# Patient Record
Sex: FEMALE | Race: WHITE | Employment: UNEMPLOYED | URBAN - METROPOLITAN AREA
[De-identification: names, ages, dates, MRNs, and addresses within clinical notes are randomized per-mention and may not be internally consistent; named-entity substitution may affect disease eponyms.]

---

## 2017-10-01 PROBLEM — F14.10 COCAINE ABUSE (HCC): Status: ACTIVE | Noted: 2017-10-01

## 2018-09-29 ENCOUNTER — HOSPITAL ENCOUNTER (EMERGENCY)
Age: 43
Discharge: HOME | End: 2018-09-30
Attending: EMERGENCY MEDICINE
Payer: MEDICARE

## 2018-09-29 VITALS
WEIGHT: 105 LBS | HEART RATE: 94 BPM | RESPIRATION RATE: 16 BRPM | TEMPERATURE: 97.9 F | DIASTOLIC BLOOD PRESSURE: 65 MMHG | HEIGHT: 67 IN | BODY MASS INDEX: 16.48 KG/M2 | OXYGEN SATURATION: 97 % | SYSTOLIC BLOOD PRESSURE: 116 MMHG

## 2018-09-29 DIAGNOSIS — L08.9 SKIN PUSTULE: Primary | ICD-10-CM

## 2018-09-29 PROCEDURE — 99282 EMERGENCY DEPT VISIT SF MDM: CPT

## 2018-09-29 ASSESSMENT — PAIN DESCRIPTION - DESCRIPTORS: DESCRIPTORS: THROBBING

## 2018-09-29 ASSESSMENT — PAIN DESCRIPTION - ORIENTATION: ORIENTATION: POSTERIOR

## 2018-09-29 ASSESSMENT — PAIN SCALES - GENERAL: PAINLEVEL_OUTOF10: 6

## 2018-09-29 ASSESSMENT — PAIN DESCRIPTION - PAIN TYPE: TYPE: ACUTE PAIN

## 2018-09-29 ASSESSMENT — PAIN DESCRIPTION - LOCATION: LOCATION: HEAD

## 2018-09-30 PROCEDURE — 6370000000 HC RX 637 (ALT 250 FOR IP): Performed by: EMERGENCY MEDICINE

## 2018-09-30 RX ORDER — DOXYCYCLINE HYCLATE 100 MG
100 TABLET ORAL 2 TIMES DAILY
Qty: 14 TABLET | Refills: 0 | Status: SHIPPED | OUTPATIENT
Start: 2018-09-30 | End: 2018-10-07

## 2018-09-30 RX ORDER — DIAPER,BRIEF,INFANT-TODD,DISP
EACH MISCELLANEOUS
Qty: 30 G | Refills: 0 | Status: SHIPPED | OUTPATIENT
Start: 2018-09-30

## 2018-09-30 RX ORDER — DOXYCYCLINE 100 MG/1
100 CAPSULE ORAL ONCE
Status: COMPLETED | OUTPATIENT
Start: 2018-09-30 | End: 2018-09-30

## 2018-09-30 RX ADMIN — DOXYCYCLINE 100 MG: 100 CAPSULE ORAL at 00:11

## 2018-09-30 NOTE — ED PROVIDER NOTES
[facility] EMERGENCY DEPT VISIT      Patient Identification  Sandrita Barrientos is a 37 y.o. female. Chief Complaint   Mass (Pt came in by squad for \"bumps to the back of head\". Pt concerned with bug bites to posterior head. Has been trying to drain the middle lump +Pus; Scab to one of the areas. Also recently started Suboxone. )      History of Present Illness: This is a  37 y.o. female who presents ambulatory  to the ED with complaints of 2 day h/o painful knots to the back of her scalp. No fever. Not pruritic. She found a dead spider in her bed today. No other bites or lesions. Past Medical History:   Diagnosis Date    Drug abuse     Fibromyalgia     Migraine        Past Surgical History:   Procedure Laterality Date     SECTION       SECTION, CLASSIC         No current facility-administered medications for this encounter. Current Outpatient Prescriptions:     doxycycline hyclate (VIBRA-TABS) 100 MG tablet, Take 1 tablet by mouth 2 times daily for 7 days, Disp: 14 tablet, Rfl: 0    hydrocortisone 1 % ointment, Apply topically 2 times daily. , Disp: 30 g, Rfl: 0    Buprenorphine HCl-Naloxone HCl (SUBOXONE SL), Place 8 mg under the tongue, Disp: , Rfl:     gabapentin (NEURONTIN) 800 MG tablet, Take 800 mg by mouth 3 times daily, Disp: , Rfl:     ondansetron (ZOFRAN ODT) 4 MG disintegrating tablet, Take 1 tablet by mouth every 8 hours as needed for Nausea or Vomiting, Disp: 10 tablet, Rfl: 0    QUEtiapine (SEROQUEL XR) 50 MG extended release tablet, Take 50 mg by mouth nightly, Disp: , Rfl:     pregabalin (LYRICA) 50 MG capsule, Take 50 mg by mouth 3 times daily, Disp: , Rfl:     Allergies   Allergen Reactions    Pcn [Penicillins]        Social History     Social History    Marital status: Unknown     Spouse name: N/A    Number of children: N/A    Years of education: N/A     Occupational History    Not on file.      Social History Main Topics    Smoking status: Current Every Day Smoker     Packs/day: 1.50     Types: Cigarettes    Smokeless tobacco: Never Used    Alcohol use Yes      Comment: occ    Drug use: Yes     Types: Cocaine      Comment: Smokes cocaine.  Sexual activity: Not Currently     Other Topics Concern    Not on file     Social History Narrative    ** Merged History Encounter **            Nursing Notes Reviewed      ROS:  General: no fever  ENT: no sinus congestion, no sore throat  RESP: no cough, no shortness of breath  GI: no abdominal pain, no vomiting, no diarrhea  Musculoskeletal: no arthralgia, no myalgia, no back pain, no neck pain  NEURO: + headache, no numbness, no weakness  DERM: + rash, no erythema, no ecchymosis, no wounds      PHYSICAL EXAM:  GENERAL APPEARANCE: Nicole Pagan is in no acute respiratory distress. Awake and alert. VITAL SIGNS:   ED Triage Vitals [09/29/18 2323]   Enc Vitals Group      /65      Pulse 94      Resp 16      Temp 97.9 °F (36.6 °C)      Temp Source Oral      SpO2 97 %      Weight 105 lb (47.6 kg)      Height 5' 7\" (1.702 m)      Head Circumference       Peak Flow       Pain Score       Pain Loc       Pain Edu? Excl. in [address]? HEAD: Normocephalic, atraumatic. Several erythematous papules on posterior scalp that look like bites. No fluctuance. All 1cm or less in size. Tender occipital adenopathy present  EYES:  Extraocular muscles are intact. Conjunctivas are pink. Negative scleral icterus. ENT:  Mucous membranes are moist.  Pharynx without erythema or exudates. NECK: Nontender and supple. CHEST: Clear to auscultation bilaterally. No rales, rhonchi, or wheezing. HEART:  Regular rate and rhythm. No murmurs. Strong and equal pulses in the upper and lower extremities. ABDOMEN: Soft,  nondistended, positive bowel sounds. abdomen is nontender. MUSCULOSKELETAL:  Active range of motion of the upper and lower extremities. No edema. NEUROLOGICAL: Awake, alert and oriented x 3.  Power intact in the upper and lower extremities. DERMATOLOGIC: No petechiae, rashes, or ecchymoses. ED COURSE AND MEDICAL DECISION MAKING:    Treatment in the department:  Patient received   Medications   doxycycline monohydrate (MONODOX) capsule 100 mg (100 mg Oral Given 9/30/18 0011)      while in the ED. Medical decision making:  Patient presents emergency Department with several skin lesions over her scalp. These do look like bites. I'm not appreciating any signs of lice. There is no fluctuance. This could be bites or possibly folliculitis. We'll start her on oral antibiotics as well as hydrocortisone ointment. I estimate there is LOW risk for CELLULITIS, MENINGITIS, NECROTIZING FASCIITIS, MENINGOCOCCEMIA, DEEP SPACE INFECTION, thus I consider the discharge disposition reasonable. Also, there is no evidence or peritonitis, sepsis, or toxicity. Mickey Rossi and I have discussed the diagnosis and risks, and we agree with discharging home to follow-up with their primary doctor. We also discussed returning to the Emergency Department immediately if new or worsening symptoms occur. We have discussed the symptoms which are most concerning (e.g., changing or worsening pain, fever, numbness, weakness, cool or painful digits) that necessitate immediate return      Clinical Impression:  1. Skin pustule        Dispo:  Patient will be discharged  at this time. Patient was informed of this decision and agrees with plan. I have discussed lab and xray findings with patient and they understand. Questions were answered to the best of my ability. Discharge vitals:  Blood pressure 116/65, pulse 94, temperature 97.9 °F (36.6 °C), temperature source Oral, resp. rate 16, height 5' 7\" (1.702 m), weight 47.6 kg (105 lb), last menstrual period 09/23/2018, SpO2 97 %.     Prescriptions given:   Discharge Medication List as of 9/30/2018 12:07 AM      START taking these medications    Details   doxycycline hyclate (VIBRA-TABS) 100 MG tablet Take 1 tablet by mouth 2 times daily for 7 days, Disp-14 tablet, R-0Print      hydrocortisone 1 % ointment Apply topically 2 times daily. , Disp-30 g, R-0, Print               This chart was created using dragon voice recognition software.        Ghanshyam Uribe MD  09/30/18 0157

## 2019-01-03 ENCOUNTER — HOSPITAL ENCOUNTER (EMERGENCY)
Age: 44
Discharge: HOME | End: 2019-01-03
Attending: EMERGENCY MEDICINE
Payer: MEDICARE

## 2019-01-03 VITALS
DIASTOLIC BLOOD PRESSURE: 86 MMHG | TEMPERATURE: 98.7 F | WEIGHT: 92.7 LBS | HEART RATE: 97 BPM | OXYGEN SATURATION: 99 % | HEIGHT: 67 IN | RESPIRATION RATE: 20 BRPM | SYSTOLIC BLOOD PRESSURE: 133 MMHG | BODY MASS INDEX: 14.55 KG/M2

## 2019-01-03 DIAGNOSIS — G89.29 CHRONIC BILATERAL LOW BACK PAIN WITHOUT SCIATICA: Primary | ICD-10-CM

## 2019-01-03 DIAGNOSIS — M54.50 CHRONIC BILATERAL LOW BACK PAIN WITHOUT SCIATICA: Primary | ICD-10-CM

## 2019-01-03 LAB
BACTERIA: ABNORMAL /HPF
BILIRUBIN URINE: NEGATIVE MG/DL
BLOOD, URINE: ABNORMAL
CLARITY: ABNORMAL
COLOR: ABNORMAL
EPITHELIAL CELLS, UA: ABNORMAL /HPF
GLUCOSE URINE: NEGATIVE MG/DL
KETONES, URINE: NEGATIVE MG/DL
LEUKOCYTE ESTERASE, URINE: ABNORMAL
MICROSCOPIC EXAMINATION: YES
NITRITE, URINE: NEGATIVE
PH UA: 6
PREGNANCY, URINE: NEGATIVE
PROTEIN UA: NEGATIVE MG/DL
RBC UA: ABNORMAL /HPF (ref 0–2)
SPECIFIC GRAVITY UA: 1.01
UROBILINOGEN, URINE: 0.2 E.U./DL
WBC UA: ABNORMAL /HPF (ref 0–5)

## 2019-01-03 PROCEDURE — 81001 URINALYSIS AUTO W/SCOPE: CPT

## 2019-01-03 PROCEDURE — 6370000000 HC RX 637 (ALT 250 FOR IP): Performed by: EMERGENCY MEDICINE

## 2019-01-03 PROCEDURE — 99283 EMERGENCY DEPT VISIT LOW MDM: CPT

## 2019-01-03 PROCEDURE — 84703 CHORIONIC GONADOTROPIN ASSAY: CPT

## 2019-01-03 RX ORDER — ACETAMINOPHEN 500 MG
1000 TABLET ORAL ONCE
Status: COMPLETED | OUTPATIENT
Start: 2019-01-03 | End: 2019-01-03

## 2019-01-03 RX ORDER — LIDOCAINE 4 G/G
1 PATCH TOPICAL ONCE
Status: DISCONTINUED | OUTPATIENT
Start: 2019-01-03 | End: 2019-01-03 | Stop reason: HOSPADM

## 2019-01-03 RX ORDER — LIDOCAINE 50 MG/G
1 PATCH TOPICAL DAILY
Qty: 30 PATCH | Refills: 0 | Status: SHIPPED | OUTPATIENT
Start: 2019-01-03

## 2019-01-03 RX ADMIN — ACETAMINOPHEN 1000 MG: 500 TABLET, FILM COATED ORAL at 05:53

## 2019-01-03 ASSESSMENT — PAIN DESCRIPTION - FREQUENCY: FREQUENCY: CONTINUOUS

## 2019-01-03 ASSESSMENT — ENCOUNTER SYMPTOMS
EYES NEGATIVE: 1
GASTROINTESTINAL NEGATIVE: 1
BACK PAIN: 1
RESPIRATORY NEGATIVE: 1

## 2019-01-03 ASSESSMENT — PAIN DESCRIPTION - DESCRIPTORS: DESCRIPTORS: SHOOTING;SHARP

## 2019-01-03 ASSESSMENT — PAIN DESCRIPTION - LOCATION: LOCATION: FLANK

## 2019-01-03 ASSESSMENT — PAIN DESCRIPTION - ORIENTATION: ORIENTATION: RIGHT

## 2019-01-03 ASSESSMENT — PAIN SCALES - GENERAL
PAINLEVEL_OUTOF10: 10
PAINLEVEL_OUTOF10: 10

## 2019-01-03 ASSESSMENT — PAIN DESCRIPTION - PAIN TYPE: TYPE: ACUTE PAIN

## 2019-03-27 ENCOUNTER — HOSPITAL ENCOUNTER (EMERGENCY)
Age: 44
Discharge: HOME | End: 2019-03-27
Attending: EMERGENCY MEDICINE
Payer: MEDICARE

## 2019-03-27 VITALS
OXYGEN SATURATION: 97 % | DIASTOLIC BLOOD PRESSURE: 76 MMHG | RESPIRATION RATE: 16 BRPM | WEIGHT: 104.6 LBS | HEIGHT: 67 IN | BODY MASS INDEX: 16.42 KG/M2 | HEART RATE: 89 BPM | TEMPERATURE: 97.8 F | SYSTOLIC BLOOD PRESSURE: 113 MMHG

## 2019-03-27 DIAGNOSIS — N76.4 ABSCESS OF LEFT GENITAL LABIA: Primary | ICD-10-CM

## 2019-03-27 PROCEDURE — 99282 EMERGENCY DEPT VISIT SF MDM: CPT

## 2019-03-27 PROCEDURE — 6370000000 HC RX 637 (ALT 250 FOR IP): Performed by: EMERGENCY MEDICINE

## 2019-03-27 RX ORDER — CEPHALEXIN 500 MG/1
1000 CAPSULE ORAL ONCE
Status: COMPLETED | OUTPATIENT
Start: 2019-03-27 | End: 2019-03-27

## 2019-03-27 RX ORDER — SULFAMETHOXAZOLE AND TRIMETHOPRIM 800; 160 MG/1; MG/1
1 TABLET ORAL 2 TIMES DAILY
Qty: 20 TABLET | Refills: 0 | Status: SHIPPED | OUTPATIENT
Start: 2019-03-27 | End: 2019-04-06

## 2019-03-27 RX ORDER — CEPHALEXIN 500 MG/1
500 CAPSULE ORAL 3 TIMES DAILY
Qty: 30 CAPSULE | Refills: 0 | Status: SHIPPED | OUTPATIENT
Start: 2019-03-27 | End: 2021-06-20

## 2019-03-27 RX ORDER — OXCARBAZEPINE 600 MG/1
600 TABLET, FILM COATED ORAL 2 TIMES DAILY
COMMUNITY
End: 2021-06-20

## 2019-03-27 RX ORDER — IBUPROFEN 600 MG/1
600 TABLET ORAL EVERY 8 HOURS PRN
Qty: 20 TABLET | Refills: 0 | Status: SHIPPED | OUTPATIENT
Start: 2019-03-27

## 2019-03-27 RX ORDER — SULFAMETHOXAZOLE AND TRIMETHOPRIM 800; 160 MG/1; MG/1
1 TABLET ORAL ONCE
Status: COMPLETED | OUTPATIENT
Start: 2019-03-27 | End: 2019-03-27

## 2019-03-27 RX ADMIN — SULFAMETHOXAZOLE AND TRIMETHOPRIM 1 TABLET: 800; 160 TABLET ORAL at 18:12

## 2019-03-27 RX ADMIN — CEPHALEXIN 1000 MG: 500 CAPSULE ORAL at 18:12

## 2019-03-27 ASSESSMENT — PAIN DESCRIPTION - FREQUENCY: FREQUENCY: CONTINUOUS

## 2019-03-27 ASSESSMENT — PAIN DESCRIPTION - PAIN TYPE: TYPE: ACUTE PAIN

## 2019-03-27 ASSESSMENT — PAIN SCALES - GENERAL: PAINLEVEL_OUTOF10: 5

## 2019-03-27 ASSESSMENT — PAIN DESCRIPTION - DESCRIPTORS: DESCRIPTORS: THROBBING

## 2021-06-20 ENCOUNTER — HOSPITAL ENCOUNTER (EMERGENCY)
Age: 46
Discharge: HOME | End: 2021-06-20
Attending: EMERGENCY MEDICINE
Payer: MEDICARE

## 2021-06-20 VITALS
SYSTOLIC BLOOD PRESSURE: 114 MMHG | RESPIRATION RATE: 20 BRPM | HEIGHT: 67 IN | HEART RATE: 81 BPM | BODY MASS INDEX: 15.22 KG/M2 | TEMPERATURE: 97.6 F | WEIGHT: 97 LBS | OXYGEN SATURATION: 98 % | DIASTOLIC BLOOD PRESSURE: 79 MMHG

## 2021-06-20 DIAGNOSIS — Z20.2 POSSIBLE EXPOSURE TO STD: ICD-10-CM

## 2021-06-20 DIAGNOSIS — A59.01 TRICHOMONAS VAGINITIS: Primary | ICD-10-CM

## 2021-06-20 LAB
BACTERIA WET PREP: ABNORMAL
BACTERIA: ABNORMAL /HPF
BILIRUBIN URINE: ABNORMAL
BLOOD, URINE: ABNORMAL
CLARITY: ABNORMAL
CLUE CELLS: ABNORMAL
COLOR: YELLOW
EPITHELIAL CELLS WET PREP: ABNORMAL
EPITHELIAL CELLS, UA: ABNORMAL /HPF (ref 0–5)
GLUCOSE URINE: NEGATIVE MG/DL
HCG(URINE) PREGNANCY TEST: NEGATIVE
KETONES, URINE: NEGATIVE MG/DL
LEUKOCYTE ESTERASE, URINE: NEGATIVE
MICROSCOPIC EXAMINATION: YES
MUCUS: ABNORMAL /LPF
NITRITE, URINE: NEGATIVE
PH UA: 6 (ref 5–8)
PROTEIN UA: NEGATIVE MG/DL
RBC UA: ABNORMAL /HPF (ref 0–4)
RBC WET PREP: ABNORMAL
SOURCE WET PREP: ABNORMAL
SPECIFIC GRAVITY UA: >=1.03 (ref 1–1.03)
TRICHOMONAS PREP: ABNORMAL
TRICHOMONAS: PRESENT /HPF
URINE TYPE: ABNORMAL
UROBILINOGEN, URINE: 1 E.U./DL
WBC UA: ABNORMAL /HPF (ref 0–5)
WBC WET PREP: ABNORMAL
YEAST WET PREP: ABNORMAL

## 2021-06-20 PROCEDURE — 99283 EMERGENCY DEPT VISIT LOW MDM: CPT

## 2021-06-20 PROCEDURE — 87210 SMEAR WET MOUNT SALINE/INK: CPT

## 2021-06-20 PROCEDURE — 96372 THER/PROPH/DIAG INJ SC/IM: CPT

## 2021-06-20 PROCEDURE — 87591 N.GONORRHOEAE DNA AMP PROB: CPT

## 2021-06-20 PROCEDURE — 87491 CHLMYD TRACH DNA AMP PROBE: CPT

## 2021-06-20 PROCEDURE — 81001 URINALYSIS AUTO W/SCOPE: CPT

## 2021-06-20 PROCEDURE — 6370000000 HC RX 637 (ALT 250 FOR IP): Performed by: EMERGENCY MEDICINE

## 2021-06-20 PROCEDURE — 6360000002 HC RX W HCPCS: Performed by: EMERGENCY MEDICINE

## 2021-06-20 PROCEDURE — 84703 CHORIONIC GONADOTROPIN ASSAY: CPT

## 2021-06-20 RX ORDER — AZITHROMYCIN 250 MG/1
1000 TABLET, FILM COATED ORAL ONCE
Status: COMPLETED | OUTPATIENT
Start: 2021-06-20 | End: 2021-06-20

## 2021-06-20 RX ORDER — CEFTRIAXONE 500 MG/1
500 INJECTION, POWDER, FOR SOLUTION INTRAMUSCULAR; INTRAVENOUS ONCE
Status: COMPLETED | OUTPATIENT
Start: 2021-06-20 | End: 2021-06-20

## 2021-06-20 RX ORDER — METRONIDAZOLE 500 MG/1
500 TABLET ORAL 2 TIMES DAILY
Qty: 14 TABLET | Refills: 0 | Status: SHIPPED | OUTPATIENT
Start: 2021-06-20 | End: 2021-06-27

## 2021-06-20 RX ADMIN — AZITHROMYCIN MONOHYDRATE 1000 MG: 250 TABLET ORAL at 10:17

## 2021-06-20 RX ADMIN — CEFTRIAXONE SODIUM 500 MG: 500 INJECTION, POWDER, FOR SOLUTION INTRAMUSCULAR; INTRAVENOUS at 10:18

## 2021-06-20 ASSESSMENT — PAIN DESCRIPTION - DESCRIPTORS: DESCRIPTORS: CRAMPING

## 2021-06-20 ASSESSMENT — PAIN DESCRIPTION - LOCATION: LOCATION: VAGINA

## 2021-06-20 ASSESSMENT — PAIN DESCRIPTION - FREQUENCY: FREQUENCY: INTERMITTENT

## 2021-06-20 ASSESSMENT — PAIN DESCRIPTION - PAIN TYPE: TYPE: ACUTE PAIN

## 2021-06-20 ASSESSMENT — PAIN SCALES - GENERAL: PAINLEVEL_OUTOF10: 3

## 2021-06-20 NOTE — ED PROVIDER NOTES
and rhythm  Abdomen:  Nontender  Extremities:  Full range of motion  Skin:   No rashes or lesions to exposed skin  Neuro:  Alert and OX3. Speech clear and appropriate. No extremity weakness. Normal sensation in all extremities. No facial asymmetry. Gait normal.  Psych:   Affect normal. Mood normal  :      Deferred      RADIOLOGY:      LAB  Labs Reviewed   WET PREP, GENITAL - Abnormal; Notable for the following components:       Result Value    Trichomonas Prep PRESENT  1+ (*)     All other components within normal limits    Narrative:     Performed at:  [facility]  [employer]  [place] [address]   Phone [phone] - Abnormal; Notable for the following components:    Clarity, UA CLOUDY (*)     Bilirubin Urine SMALL (*)     Blood, Urine SMALL (*)     All other components within normal limits    Narrative:     Performed at:  [facility]  [employer]  [facility] [employer]   Phone [phone]   MICROSCOPIC URINALYSIS - Abnormal; Notable for the following components:    Mucus, UA 2+ (*)     RBC, UA 5-10 (*)     Epithelial Cells, UA 21-50 (*)     Bacteria, UA 2+ (*)     Trichomonas, UA Present (*)     All other components within normal limits    Narrative:     Performed at:  [facility]  [employer]  [facility] [employer]   Phone [phone]   C. TRACHOMATIS N.GONORRHOEAE DNA   PREGNANCY, URINE    Narrative:     Performed at:  [facility]  [employer]  [facility] [employer]   Phone [phone]       ED COURSE / MDM:  68-year-old female with several day history of dysuria, frequency, abnormal vaginal discharge and some intermittent pelvic cramping. She states she is concerned about the possibility of STD. She is afebrile with normal vital signs. Abdomen is benign.   She declines a pelvic exam and prefers to self swab. Urinalysis shows trichomonas and epithelial cells but no convincing evidence for UTI.  UCG was negative. Wet prep was also positive for trichomonas. DNA probe was sent and is pending. Patient is allergic to penicillin but states she can take cephalosporins. She was given an injection of Rocephin to treat for possible gonorrhea and 1 g of a azithromycin by mouth to treat for possible chlamydia. She was given a prescription for Flagyl twice daily for 7 days to treat for trichomonas. I recommended her sexual partner get treated and no intercourse for at least 1 week after both she and her partner have been treated. I discussed with Steve Mar the results of evaluation in the Emergency Department, diagnosis, care and prognosis. The plan is to discharge to home. The patient is in agreement with the plan and questions have been answered. I also discussed with the patient and/or family the reasons which may require a return visit and the importance of follow-up care.        (Please note that portions of this note may have been completed with a voice recognition program.  Efforts were made to edit the dictation but occasionally words are mis-transcribed)        FINAL IMPRESSION:  1 -trichomonas vaginitis  2 --possible exposure to STD                   Ralf Hankins MD  06/20/21 1010

## 2021-06-22 LAB
C TRACH DNA GENITAL QL NAA+PROBE: NEGATIVE
N. GONORRHOEAE DNA: NEGATIVE

## 2022-03-16 ENCOUNTER — HOSPITAL ENCOUNTER (EMERGENCY)
Age: 47
Discharge: HOME | End: 2022-03-16
Attending: EMERGENCY MEDICINE
Payer: MEDICARE

## 2022-03-16 VITALS
TEMPERATURE: 98 F | SYSTOLIC BLOOD PRESSURE: 121 MMHG | OXYGEN SATURATION: 100 % | HEIGHT: 67 IN | DIASTOLIC BLOOD PRESSURE: 71 MMHG | WEIGHT: 97 LBS | BODY MASS INDEX: 15.22 KG/M2 | RESPIRATION RATE: 20 BRPM | HEART RATE: 104 BPM

## 2022-03-16 DIAGNOSIS — F11.93 OPIOID WITHDRAWAL (HCC): Primary | ICD-10-CM

## 2022-03-16 PROCEDURE — 6370000000 HC RX 637 (ALT 250 FOR IP): Performed by: EMERGENCY MEDICINE

## 2022-03-16 PROCEDURE — 99283 EMERGENCY DEPT VISIT LOW MDM: CPT

## 2022-03-16 RX ORDER — ONDANSETRON HYDROCHLORIDE 8 MG/1
8 TABLET, FILM COATED ORAL EVERY 8 HOURS PRN
Qty: 20 TABLET | Refills: 0 | Status: SHIPPED | OUTPATIENT
Start: 2022-03-16

## 2022-03-16 RX ORDER — BUPRENORPHINE AND NALOXONE 8; 2 MG/1; MG/1
1 FILM, SOLUBLE BUCCAL; SUBLINGUAL 2 TIMES DAILY
Qty: 16 FILM | Refills: 0 | Status: SHIPPED | OUTPATIENT
Start: 2022-03-16 | End: 2022-04-15

## 2022-03-16 RX ORDER — BUPRENORPHINE 2 MG/1
8 TABLET SUBLINGUAL ONCE
Status: COMPLETED | OUTPATIENT
Start: 2022-03-16 | End: 2022-03-16

## 2022-03-16 RX ORDER — LOPERAMIDE HYDROCHLORIDE 2 MG/1
2 CAPSULE ORAL 4 TIMES DAILY PRN
Qty: 9 CAPSULE | Refills: 0 | Status: SHIPPED | OUTPATIENT
Start: 2022-03-16 | End: 2022-03-26

## 2022-03-16 RX ADMIN — BUPRENORPHINE HCL 8 MG: 2 TABLET SUBLINGUAL at 21:41

## 2022-03-16 ASSESSMENT — PAIN DESCRIPTION - LOCATION: LOCATION: GENERALIZED

## 2022-03-16 ASSESSMENT — PAIN SCALES - GENERAL
PAINLEVEL_OUTOF10: 10
PAINLEVEL_OUTOF10: 10

## 2022-03-16 ASSESSMENT — PAIN DESCRIPTION - DESCRIPTORS: DESCRIPTORS: ACHING

## 2022-03-16 ASSESSMENT — PAIN DESCRIPTION - PAIN TYPE: TYPE: ACUTE PAIN

## 2022-03-16 ASSESSMENT — PAIN DESCRIPTION - FREQUENCY: FREQUENCY: CONTINUOUS

## 2022-03-17 NOTE — ED PROVIDER NOTES
[facility]  eMERGENCY dEPARTMENT eNCOUnter      Pt Name: Lesa Clarke  MRN: [id number]  [place] 1975  Date of evaluation: 3/16/2022  Provider: Waldo Rodriguez MD  PCP: No primary care provider on file. CHIEF COMPLAINT       Chief Complaint   Patient presents with    Other     suboxan withdrawal,nausea diarrhea       HISTORY OFPRESENT ILLNESS   (Location/Symptom, Timing/Onset, Context/Setting, Quality, Duration, Modifying Factors,Severity)  Note limiting factors. Lesa Clarke is a 50 y.o. female with multiple medical problems says that she is in Suboxone withdrawal with nausea and diarrhea and chills she says she cannot get her prescription filled she denies any vomiting at this time denies any headache    Nursing Notes were all reviewed and agreed with or any disagreements were addressed  in the HPI. REVIEW OF SYSTEMS    (2-9 systems for level 4, 10 or more for level 5)     Review of Systems    Positives and Pertinent negatives as per HPI. Except as noted above in the ROS, all other systems were reviewed andnegative. PASTMEDICAL HISTORY     Past Medical History:   Diagnosis Date    Drug abuse ([facility] [address]     Fibromyalgia     Migraine          SURGICAL HISTORY       Past Surgical History:   Procedure Laterality Date     SECTION       SECTION, CLASSIC           CURRENT MEDICATIONS       Previous Medications    HYDROCORTISONE 1 % OINTMENT    Apply topically 2 times daily. IBUPROFEN (IBU) 600 MG TABLET    Take 1 tablet by mouth every 8 hours as needed for Pain or Fever (with food)    LIDOCAINE (LIDODERM) 5 %    Place 1 patch onto the skin daily 12 hours on, 12 hours off. ALLERGIES     Pcn [penicillins]    FAMILY HISTORY     History reviewed. No pertinent family history.        SOCIAL HISTORY       Social History     Socioeconomic History    Marital status: Single     Spouse name: None    Number of children: None    Years of education: None    Highest education level: None   Occupational History    None   Tobacco Use    Smoking status: Current Every Day Smoker     Packs/day: 1.50     Types: Cigarettes    Smokeless tobacco: Never Used   Substance and Sexual Activity    Alcohol use: Yes     Comment: occ    Drug use: Not Currently     Types: Cocaine     Comment: Smokes cocaine. 1/3 states she has been clean since labor day    Sexual activity: Not Currently   Other Topics Concern    None   Social History Narrative    ** Merged History Encounter **          Social Determinants of Health     Financial Resource Strain:     Difficulty of Paying Living Expenses: Not on file   Food Insecurity:     Worried About Running Out of Food in the Last Year: Not on file    [place] of Food in the Last Year: Not on file   Transportation Needs:     Lack of Transportation (Medical): Not on file    Lack of Transportation (Non-Medical):  Not on file   Physical Activity:     Days of Exercise per Week: Not on file    Minutes of Exercise per Session: Not on file   Stress:     Feeling of Stress : Not on file   Social Connections:     Frequency of Communication with Friends and Family: Not on file    Frequency of Social Gatherings with Friends and Family: Not on file    Attends Temple Services: Not on file    Active Member of [address] [place] [address] [employer] or Organizations: Not on file    Attends Club or Organization Meetings: Not on file    Marital Status: Not on file   Intimate Partner Violence:     Fear of Current or Ex-Partner: Not on file    Emotionally Abused: Not on file    Physically Abused: Not on file    Sexually Abused: Not on file   Housing Stability:     Unable to Pay for Housing in the Last Year: Not on file    Number of [place] in the Last Year: Not on file    Unstable Housing in the Last Year: Not on file       SCREENINGS    [place] Coma Scale  Eye Opening: Spontaneous  Best Verbal Response: Oriented  Best Motor Response: Obeys commands  [place] Coma Scale Score: 15 @FLOW([id number])@      PHYSICAL EXAM    (up to 7 for level 4, 8 or more for level 5)     ED Triage Vitals [03/16/22 2115]   BP Temp Temp Source Pulse Resp SpO2 Height Weight   121/71 98 °F (36.7 °C) Oral 104 20 100 % 5' 7\" (1.702 m) 97 lb (44 kg)       Physical Exam      General Appearance:  Alert, cooperative, no distress, appears stated age. Head:  Normocephalic, without obviousabnormality, atraumatic. Eyes:  conjunctiva/corneas clear, EOM's intact. Sclera anicteric. ENT: Mucous membranes moist.   Neck: Supple, symmetrical, trachea midline, no adenopathy. No jugular venous distention. Lungs:   Clear to auscultation bilaterally, respirationsunlabored. No rales, rhonchi or wheezes. Chest Wall:  No tenderness. Heart:  Regular rate and rhythm, S1 and S2 normal, no murmur, rub or gallop. Abdomen:   Soft, non-tender, bowel sounds active,   no masses, no organomegaly. Extremities: No edema, cords or calf tenderness. Full range of motion. Pulses: 2+ and symmetric   Skin: Turgor is normal, no rashes or lesions. Neurologic: Alert and oriented X 3. No focal findings. Motor grossly normal.  Speech clear, no drift, CN III-XII grossly intact,        DIAGNOSTIC RESULTS   LABS:    Labs Reviewed - No data to display    All other labs were within normal range or not returned as of this dictation. EKG: All EKG's are interpreted by the Emergency Department Physician who eithersigns or Co-signs this chart in the absence of a cardiologist.        RADIOLOGY:   Non-plain film images such as CT, Ultrasound and MRI are read by the radiologist. Plain radiographic images are visualized by myself.       *    Interpretation per the Radiologist below, if available at the time of this note:    No orders to display         PROCEDURES   Unless otherwise noted below, none     Procedures    *    CRITICAL CARE TIME   N/A      EMERGENCY DEPARTMENT COURSE and DIFFERENTIALDIAGNOSIS/MDM: Vitals:    Vitals:    03/16/22 2115   BP: 121/71   Pulse: 104   Resp: 20   Temp: 98 °F (36.7 °C)   TempSrc: Oral   SpO2: 100%   Weight: 97 lb (44 kg)   Height: 5' 7\" (1.702 m)       Patient was given thefollowing medications:  Medications   buprenorphine (SUBUTEX) SL tablet 8 mg (has no administration in time range)           The patient tolerated their visit well. The patient and / or the familywere informed of the results of any tests, a time was given to answer questions. FINAL IMPRESSION      1.  Opioid withdrawal ([facility] [address]          DISPOSITION/PLAN   DISPOSITION Discharge - Pending Orders Complete 03/16/2022 09:32:09 PM      PATIENT REFERRED TO:  Your PCP    In 2 days        DISCHARGE MEDICATIONS:  New Prescriptions    LOPERAMIDE (RA ANTI-DIARRHEAL) 2 MG CAPSULE    Take 1 capsule by mouth 4 times daily as needed for Diarrhea    ONDANSETRON (ZOFRAN) 8 MG TABLET    Take 1 tablet by mouth every 8 hours as needed for Nausea       DISCONTINUED MEDICATIONS:  Discontinued Medications    No medications on file              (Please note that portions of this note were completed with a voice recognition program.  Efforts were made to edit the dictations but occasionally words are mis-transcribed.)    Dorita Clark MD (electronically signed)      Dorita Clark MD  03/16/22 4214

## 2022-03-17 NOTE — ED NOTES
Patient prepared for and ready to be discharged. Patient discharged at this time in no acute distress after verbalizing understanding of discharge instructions. Patient left after receiving After Visit Summary instructions.         Alejandro Wheeler RN  03/16/22 2440

## 2022-09-24 ENCOUNTER — APPOINTMENT (OUTPATIENT)
Dept: CT IMAGING | Age: 47
End: 2022-09-24
Payer: COMMERCIAL

## 2022-09-24 ENCOUNTER — HOSPITAL ENCOUNTER (EMERGENCY)
Age: 47
Discharge: HOME | End: 2022-09-24
Attending: EMERGENCY MEDICINE
Payer: COMMERCIAL

## 2022-09-24 VITALS
HEART RATE: 88 BPM | TEMPERATURE: 98.7 F | DIASTOLIC BLOOD PRESSURE: 84 MMHG | BODY MASS INDEX: 15.56 KG/M2 | OXYGEN SATURATION: 98 % | RESPIRATION RATE: 18 BRPM | WEIGHT: 99.13 LBS | HEIGHT: 67 IN | SYSTOLIC BLOOD PRESSURE: 120 MMHG

## 2022-09-24 DIAGNOSIS — N63.0 BREAST MASS IN FEMALE: Primary | ICD-10-CM

## 2022-09-24 DIAGNOSIS — G62.9 PERIPHERAL POLYNEUROPATHY: ICD-10-CM

## 2022-09-24 LAB
ANION GAP SERPL CALCULATED.3IONS-SCNC: 8 MMOL/L (ref 3–16)
BASOPHILS ABSOLUTE: 0.1 K/UL (ref 0–0.2)
BASOPHILS RELATIVE PERCENT: 0.6 %
BUN BLDV-MCNC: 11 MG/DL (ref 7–20)
CALCIUM SERPL-MCNC: 9 MG/DL (ref 8.3–10.6)
CHLORIDE BLD-SCNC: 105 MMOL/L (ref 99–110)
CO2: 26 MMOL/L (ref 21–32)
CREAT SERPL-MCNC: <0.5 MG/DL (ref 0.6–1.1)
EOSINOPHILS ABSOLUTE: 0.1 K/UL (ref 0–0.6)
EOSINOPHILS RELATIVE PERCENT: 1.5 %
GFR AFRICAN AMERICAN: >60
GFR NON-AFRICAN AMERICAN: >60
GLUCOSE BLD-MCNC: 134 MG/DL (ref 70–99)
HCT VFR BLD CALC: 38.7 % (ref 36–48)
HEMOGLOBIN: 13.4 G/DL (ref 12–16)
LYMPHOCYTES ABSOLUTE: 1 K/UL (ref 1–5.1)
LYMPHOCYTES RELATIVE PERCENT: 11.8 %
MCH RBC QN AUTO: 33.2 PG (ref 26–34)
MCHC RBC AUTO-ENTMCNC: 34.6 G/DL (ref 31–36)
MCV RBC AUTO: 96 FL (ref 80–100)
MONOCYTES ABSOLUTE: 0.5 K/UL (ref 0–1.3)
MONOCYTES RELATIVE PERCENT: 5.3 %
NEUTROPHILS ABSOLUTE: 7.1 K/UL (ref 1.7–7.7)
NEUTROPHILS RELATIVE PERCENT: 80.8 %
PDW BLD-RTO: 11.7 % (ref 12.4–15.4)
PLATELET # BLD: 210 K/UL (ref 135–450)
PMV BLD AUTO: 7 FL (ref 5–10.5)
POTASSIUM REFLEX MAGNESIUM: 3.8 MMOL/L (ref 3.5–5.1)
RBC # BLD: 4.03 M/UL (ref 4–5.2)
SODIUM BLD-SCNC: 139 MMOL/L (ref 136–145)
WBC # BLD: 8.8 K/UL (ref 4–11)

## 2022-09-24 PROCEDURE — 6370000000 HC RX 637 (ALT 250 FOR IP): Performed by: EMERGENCY MEDICINE

## 2022-09-24 PROCEDURE — 71260 CT THORAX DX C+: CPT

## 2022-09-24 PROCEDURE — 99285 EMERGENCY DEPT VISIT HI MDM: CPT

## 2022-09-24 PROCEDURE — 6360000004 HC RX CONTRAST MEDICATION: Performed by: EMERGENCY MEDICINE

## 2022-09-24 PROCEDURE — 80048 BASIC METABOLIC PNL TOTAL CA: CPT

## 2022-09-24 PROCEDURE — 85025 COMPLETE CBC W/AUTO DIFF WBC: CPT

## 2022-09-24 RX ORDER — GABAPENTIN 300 MG/1
300 CAPSULE ORAL 3 TIMES DAILY
Qty: 90 CAPSULE | Refills: 0 | Status: SHIPPED | OUTPATIENT
Start: 2022-09-24 | End: 2022-10-28 | Stop reason: SDUPTHER

## 2022-09-24 RX ORDER — IBUPROFEN 600 MG/1
600 TABLET ORAL ONCE
Status: COMPLETED | OUTPATIENT
Start: 2022-09-24 | End: 2022-09-24

## 2022-09-24 RX ADMIN — IBUPROFEN 600 MG: 600 TABLET ORAL at 15:18

## 2022-09-24 RX ADMIN — IOPAMIDOL 75 ML: 755 INJECTION, SOLUTION INTRAVENOUS at 13:31

## 2022-09-24 ASSESSMENT — PAIN DESCRIPTION - ORIENTATION
ORIENTATION_2: LEFT
ORIENTATION: RIGHT;LEFT

## 2022-09-24 ASSESSMENT — PAIN SCALES - GENERAL
PAINLEVEL_OUTOF10: 9
PAINLEVEL_OUTOF10: 9

## 2022-09-24 ASSESSMENT — PAIN - FUNCTIONAL ASSESSMENT
PAIN_FUNCTIONAL_ASSESSMENT_SITE2: ACTIVITIES ARE NOT PREVENTED
PAIN_FUNCTIONAL_ASSESSMENT: ACTIVITIES ARE NOT PREVENTED
PAIN_FUNCTIONAL_ASSESSMENT: 0-10

## 2022-09-24 ASSESSMENT — PAIN DESCRIPTION - PAIN TYPE: TYPE: CHRONIC PAIN

## 2022-09-24 ASSESSMENT — PAIN DESCRIPTION - LOCATION
LOCATION_2: BUTTOCKS
LOCATION: LEG;FOOT

## 2022-09-24 ASSESSMENT — PAIN DESCRIPTION - FREQUENCY: FREQUENCY: CONTINUOUS

## 2022-09-24 ASSESSMENT — PAIN DESCRIPTION - INTENSITY: RATING_2: 7

## 2022-09-24 ASSESSMENT — PAIN DESCRIPTION - DESCRIPTORS
DESCRIPTORS_2: SORE
DESCRIPTORS: ACHING

## 2022-09-24 NOTE — DISCHARGE INSTRUCTIONS
You have a left-sided breast mass. This needs to be further evaluated with additional imaging such as an ultrasound and you should be evaluated by breast surgeon. You have been referred to [place]. Please call their office on Monday for an appointment as soon as possible. You have been given a refill of your gabapentin at your request.  You do need to establish care with a primary care provider as well.

## 2022-09-24 NOTE — ED PROVIDER NOTES
[facility] Emergency Department      CHIEF COMPLAINT  Breast Pain (Reports two bumps on left breast and VANNESA leg/foot pain )      HISTORY OF PRESENT ILLNESS  Elisa Ryan is a 50 y.o. female with a history of fibromyalgia and migraines with a history of drug abuse presents with a tender and painful mass in the left breast.  She just noticed it 2 days ago. She states that she has gotten a mammogram in the past but it has been many years. Currently her sister is undergoing treatment for breast cancer. She denied any nipple drainage or drainage from this mass. .   No other complaints, modifying factors or associated symptoms. I have reviewed the following from the nursing documentation. Past Medical History:   Diagnosis Date    Drug abuse ([facility] [address]     Fibromyalgia     Migraine      Past Surgical History:   Procedure Laterality Date     SECTION       SECTION, CLASSIC       History reviewed. No pertinent family history. Social History     Socioeconomic History    Marital status: Single     Spouse name: Not on file    Number of children: Not on file    Years of education: Not on file    Highest education level: Not on file   Occupational History    Not on file   Tobacco Use    Smoking status: Every Day     Packs/day: 1.50     Types: Cigarettes    Smokeless tobacco: Never   Substance and Sexual Activity    Alcohol use: Not Currently     Comment: occ    Drug use: Not Currently     Types: Cocaine     Comment: Smokes cocaine.   1/3 states she has been clean since labor day    Sexual activity: Not Currently   Other Topics Concern    Not on file   Social History Narrative    ** Merged History Encounter **          Social Determinants of Health     Financial Resource Strain: Not on file   Food Insecurity: Not on file   Transportation Needs: Not on file   Physical Activity: Not on file   Stress: Not on file   Social Connections: Not on file   Intimate Partner Violence: Not on file   Housing Stability: Not on file     No current facility-administered medications for this encounter. Current Outpatient Medications   Medication Sig Dispense Refill    Buprenorphine HCl-Naloxone HCl (SUBOXONE SL) Place 16 mg under the tongue      gabapentin (NEURONTIN) 300 MG capsule Take 1 capsule by mouth 3 times daily for 30 days. 90 capsule 0    ondansetron (ZOFRAN) 8 MG tablet Take 1 tablet by mouth every 8 hours as needed for Nausea 20 tablet 0    ibuprofen (IBU) 600 MG tablet Take 1 tablet by mouth every 8 hours as needed for Pain or Fever (with food) 20 tablet 0    lidocaine (LIDODERM) 5 % Place 1 patch onto the skin daily 12 hours on, 12 hours off. 30 patch 0    hydrocortisone 1 % ointment Apply topically 2 times daily. 30 g 0     Allergies   Allergen Reactions    Pcn [Penicillins]        REVIEW OF SYSTEMS      General:  No fevers  Eyes:  No recent vison changes  ENT:  No sore throat, no nasal congestion  Cardiovascular:  no palpitations  Respiratory:   no cough, no wheezing  Gastrointestinal:  No abdominal pain, no vomiting, no diarrhea  Musculoskeletal:  No muscle pain, no joint pain  Skin:  No rash. Left breast mass that is painful. Neurologic:  No speech problems, no headache, no extremity numbness, no extremity weakness  Genitourinary:  No dysuria  Extremities:  no edema, no pain      Unless otherwise stated in this report, this patient's positive and negative responses for review of systems (constitutional, eyes, ENT, cardiovascular, respiratory, gastrointestinal, neurological, genitourinary, musculoskeletal, integument systems and systems related to the presenting problem) are either stated in the preceding paragraph, were not pertinent or were negative for the symptoms and/or complaints related to the medical problem.     PHYSICAL EXAM  /84   Pulse 88   Temp 98.7 °F (37.1 °C) (Oral)   Resp 18   Ht 5' 7\" (1.702 m)   Wt 99 lb 2 oz (45 kg)   LMP 09/24/2022   SpO2 98%   BMI 15.53 kg/m² GENERAL APPEARANCE: Awake and alert. Cooperative. No acute distress. HEAD: Normocephalic. Atraumatic. EYES: PERRL. EOM's grossly intact. ENT: Mucous membranes are moist.   NECK: Supple, trachea midline. HEART: RRR. LUNGS: Respirations unlabored. Speaking comfortably in full sentences. ABDOMEN: Soft. Non-distended. Non-tender. No guarding or rebound. EXTREMITIES: No peripheral edema. MAEE. No acute deformities. SKIN: Warm, dry and intact. No acute rashes. Left breast with a firm fixed tender mass noted in the left lower quadrant of the breast.  There is very minimal overlying skin erythema but no warmth. No tenderness or mass in the left axilla. NEUROLOGICAL: Alert and oriented X 3. PSYCHIATRIC: Normal mood and affect. LABS  I have reviewed all labs for this visit.    Results for orders placed or performed during the hospital encounter of 09/24/22   CBC with Auto Differential   Result Value Ref Range    WBC 8.8 4.0 - 11.0 K/uL    RBC 4.03 4.00 - 5.20 M/uL    Hemoglobin 13.4 12.0 - 16.0 g/dL    Hematocrit 38.7 36.0 - 48.0 %    MCV 96.0 80.0 - 100.0 fL    MCH 33.2 26.0 - 34.0 pg    MCHC 34.6 31.0 - 36.0 g/dL    RDW 11.7 (L) 12.4 - 15.4 %    Platelets 566 774 - 543 K/uL    MPV 7.0 5.0 - 10.5 fL    Neutrophils % 80.8 %    Lymphocytes % 11.8 %    Monocytes % 5.3 %    Eosinophils % 1.5 %    Basophils % 0.6 %    Neutrophils Absolute 7.1 1.7 - 7.7 K/uL    Lymphocytes Absolute 1.0 1.0 - 5.1 K/uL    Monocytes Absolute 0.5 0.0 - 1.3 K/uL    Eosinophils Absolute 0.1 0.0 - 0.6 K/uL    Basophils Absolute 0.1 0.0 - 0.2 K/uL   Basic Metabolic Panel w/ Reflex to MG   Result Value Ref Range    Sodium 139 136 - 145 mmol/L    Potassium reflex Magnesium 3.8 3.5 - 5.1 mmol/L    Chloride 105 99 - 110 mmol/L    CO2 26 21 - 32 mmol/L    Anion Gap 8 3 - 16    Glucose 134 (H) 70 - 99 mg/dL    BUN 11 7 - 20 mg/dL    Creatinine <0.5 (L) 0.6 - 1.1 mg/dL    GFR Non-African American >60 >60    GFR  >60 >60 Calcium 9.0 8.3 - 10.6 mg/dL               RADIOLOGY  X-RAYS: ALL IMAGES INCLUDING PLAIN FILMS, CT, ULTRASOUND AND MRI HAVE BEEN READ BY THE RADIOLOGIST. I have personally reviewed plain film images and have reviewed the radiology reports. CT CHEST W CONTRAST   Final Result      1. No acute intrathoracic abnormality. 2.  No CT evidence of breast/chest wall mass or adenopathy. Recommend clinical follow-up and outpatient breast ultrasound for better characterization. 3.  Moderate emphysema. 4.  Scattered low-density liver lesions favored to represent cysts. Rechecks: Physical assessment performed. Patient has been updated on the CT chest results and follow-up plan has been discussed. Sepsis:  Is this patient to be included in the SEP-1 Core Measure due to severe sepsis or septic shock? No   Exclusion criteria - the patient is NOT to be included for SEP-1 Core Measure due to: Infection is not suspected           ED COURSE/MDM  Patient seen and evaluated. Here the patient is afebrile with normal vitals signs. Old records reviewed. Here the patient has a left breast mass that is fixed, tender and easily palpable. No axillary masses noted. There is no skin changes or erythema to suggest an abscess. No evidence of cellulitis. No nipple discharge. I was hoping to obtain a breast ultrasound today but we do not have the staff on at the [facility]. to obtain a breast ultrasound. Chest CT was done which is unrevealing. I did correspond with the on-call provider for [place] [facility] and they have recommended that we refer to Dr. Villalta Saliva office for follow-up. A referral has been placed and the patient has been provided with our contact information so she can call for follow-up. She understands that she needs additional imaging and needs to see a breast surgeon as soon as possible. She will call on Monday.   She is also complaining of a flareup of her chronic peripheral neuropathy and is requesting a refill for her gabapentin which I have provided. Labs and imaging reviewed and results discussed with patient. Patient was reassessed as noted above . Plan of care discussed with patient. Patient in agreement with plan. Strict return precautions have been given. Patient was given scripts for the following medications. I counseled patient how to take these medications. New Prescriptions    GABAPENTIN (NEURONTIN) 300 MG CAPSULE    Take 1 capsule by mouth 3 times daily for 30 days. CLINICAL IMPRESSION  1. Breast mass in female    2. Peripheral polyneuropathy        Blood pressure 120/84, pulse 88, temperature 98.7 °F (37.1 °C), temperature source Oral, resp. rate 18, height 5' 7\" (1.702 m), weight 99 lb 2 oz (45 kg), last menstrual period 09/24/2022, SpO2 98 %. DISPOSITION  Fili Giraldo was discharged to home in stable condition. Jadon Stapleton MD am the primary clinician of record.     (Please note this note was completed with a voice recognition program.  Efforts were made to edit the dictations but occasionally words are mis-transcribed.)        Enrique Sweeney MD  09/24/22 1397

## 2022-10-28 ENCOUNTER — HOSPITAL ENCOUNTER (EMERGENCY)
Age: 47
Discharge: HOME | End: 2022-10-28
Attending: EMERGENCY MEDICINE
Payer: MEDICARE

## 2022-10-28 VITALS
SYSTOLIC BLOOD PRESSURE: 126 MMHG | BODY MASS INDEX: 15.25 KG/M2 | WEIGHT: 97.13 LBS | TEMPERATURE: 97.6 F | RESPIRATION RATE: 14 BRPM | HEIGHT: 67 IN | OXYGEN SATURATION: 97 % | HEART RATE: 89 BPM | DIASTOLIC BLOOD PRESSURE: 82 MMHG

## 2022-10-28 DIAGNOSIS — Z76.0 PRESCRIPTION REFILL: ICD-10-CM

## 2022-10-28 DIAGNOSIS — G89.29 CHRONIC PAIN OF BOTH LOWER EXTREMITIES: ICD-10-CM

## 2022-10-28 DIAGNOSIS — M79.605 CHRONIC PAIN OF BOTH LOWER EXTREMITIES: ICD-10-CM

## 2022-10-28 DIAGNOSIS — Z87.2 HISTORY OF BREAST ABSCESS: ICD-10-CM

## 2022-10-28 DIAGNOSIS — M79.604 CHRONIC PAIN OF BOTH LOWER EXTREMITIES: ICD-10-CM

## 2022-10-28 DIAGNOSIS — G62.9 PERIPHERAL POLYNEUROPATHY: Primary | ICD-10-CM

## 2022-10-28 PROCEDURE — 99283 EMERGENCY DEPT VISIT LOW MDM: CPT

## 2022-10-28 RX ORDER — GABAPENTIN 300 MG/1
300 CAPSULE ORAL 3 TIMES DAILY
Qty: 90 CAPSULE | Refills: 0 | Status: SHIPPED | OUTPATIENT
Start: 2022-10-28 | End: 2022-11-27

## 2022-10-28 ASSESSMENT — PAIN DESCRIPTION - DESCRIPTORS: DESCRIPTORS: NUMBNESS

## 2022-10-28 ASSESSMENT — PAIN DESCRIPTION - FREQUENCY: FREQUENCY: CONTINUOUS

## 2022-10-28 ASSESSMENT — PAIN - FUNCTIONAL ASSESSMENT: PAIN_FUNCTIONAL_ASSESSMENT: 0-10

## 2022-10-28 ASSESSMENT — PAIN DESCRIPTION - LOCATION: LOCATION: LEG

## 2022-10-28 ASSESSMENT — PAIN SCALES - GENERAL: PAINLEVEL_OUTOF10: 8

## 2022-10-28 ASSESSMENT — PAIN DESCRIPTION - ORIENTATION: ORIENTATION: RIGHT;LEFT

## 2022-10-28 ASSESSMENT — PAIN DESCRIPTION - PAIN TYPE: TYPE: CHRONIC PAIN

## 2022-10-28 NOTE — ED NOTES
Pt states that she has been out of her gabapentin for the past week and her neuropathy is getting worse in her legs and pt is has apt with a new fmd in about 2-3 weeks and states that she cannot wait that long.       Jarrod Guerrero RN  10/28/22 9896

## 2022-10-28 NOTE — DISCHARGE INSTRUCTIONS
Take the gabapentin as directed. Follow-up with Dr. Rober Flynn as scheduled in 3 weeks. Follow-up with your breast surgeon if the swelling in your left breast does not continue to improve.

## 2022-10-30 NOTE — ED PROVIDER NOTES
[facility] CHIEF COMPLAINT:   Chief Complaint   Patient presents with    Medication Refill     Pt states that she has been out of gabapentin for a week and the neuropathy in her legs is getting worse and pt states that she has a lump in her breast and seen breast surgeon twice and concerned since the knot is still there and pt took antibiotics and knot went from baseball to the size of a egg currently         HPI: Erika Richmond is a 50 y.o. female who presents to the Emergency Department with complaint of needing a refill of her gabapentin. She has a history of chronic lower extremity peripheral polyneuropathy with bilateral leg pain. She takes gabapentin 300 mg 3 times daily. The prescription was last filled here just over 1 month ago. Patient states she lost her primary care doctor but has an appointment with a new primary care doctor in 3 weeks. She has a history of recent left breast mass felt to be abscess. She was seen here and CT scan of the chest showed no breast/chest wall mass. She was seen by breast surgery and had 2 ultrasounds which showed abscess. She was given 2 separate antibiotics which she finished 2 weeks ago and she states the mass has gotten significantly smaller. She has a past history of fibromyalgia, migraine and drug abuse. She is no longer on Suboxone. REVIEW OF SYSTEMS:  6 systems reviewed. Pertinent positives per HPI. Otherwise noted to be negative. Nursing notes reviewed and agree with above. Past medical/surgical history reviewed. MEDICATIONS   Discharge Medication List as of 10/28/2022  4:46 PM        CONTINUE these medications which have CHANGED    Details   gabapentin (NEURONTIN) 300 MG capsule Take 1 capsule by mouth 3 times daily for 30 days. , Disp-90 capsule, R-0Normal           CONTINUE these medications which have NOT CHANGED    Details   Buprenorphine HCl-Naloxone HCl (SUBOXONE SL) Place 16 mg under the tongueHistorical Med      ondansetron (ZOFRAN) 8 MG tablet Take 1 tablet by mouth every 8 hours as needed for Nausea, Disp-20 tablet, R-0Print      ibuprofen (IBU) 600 MG tablet Take 1 tablet by mouth every 8 hours as needed for Pain or Fever (with food), Disp-20 tablet, R-0Print      lidocaine (LIDODERM) 5 % Place 1 patch onto the skin daily 12 hours on, 12 hours off., Disp-30 patch, R-0Print      hydrocortisone 1 % ointment Apply topically 2 times daily. , Disp-30 g, R-0, Print               ALLERGIES   Allergies   Allergen Reactions    Pcn [Penicillins]          /82   Pulse 89   Temp 97.6 °F (36.4 °C) (Oral)   Resp 14   Ht 5' 7\" (1.702 m)   Wt 97 lb 2 oz (44.1 kg)   LMP 10/26/2022   SpO2 97%   BMI 15.21 kg/m²   General:  No acute distress. Non toxic appearance  Head:   Normocephalic and atraumatic  Eyes:   Conjunctiva clear, KENRICK, EOM's intact. Sclera anicteric. ENT:   Mucous membranes moist  Neck:   Supple. No adenopathy. Lungs/Chest:  No respiratory distress  CVS:   Regular rate and rhythm  Abdomen:  Nontender  Extremities:  Full range of motion. Distal neurovascular exam is intact. Skin:   No rashes or lesions to exposed skin  Back:   Nontender  Neuro:  Alert and OX3. Speech clear and appropriate. No upper/lower extremity weakness. Normal sensation in all extremities. No facial asymmetry or weakness. Gait normal.  Psych:   Affect normal. Mood normal        RADIOLOGY:      LAB      ED COURSE / MDM:  42-year-old female with history of chronic lower extremity peripheral polyneuropathy and chronic bilateral leg pain presents with request for refill of her gabapentin. This medication was last refilled here just over a month ago. She has an appointment to see a new primary care doctor in 3 weeks. She denies any new symptoms. She has recently been treated for left breast abscess and seen by breast surgery with improvement of her symptoms.   She was given a 1 month prescription for gabapentin but I recommended she get it from her primary care doctor going forward. Advise follow-up with her breast surgeon as needed. I discussed with Pauline [place] the results of the evaluation in the Emergency Department, diagnosis, care, prognosis and the importance of follow-up. The patient is stable for discharge. The patient and/or family are in agreement with the plan and all questions have been answered. Specific discharge instructions were explained, including reasons to return to the emergency department.       (Please note that portions of this note may have been completed with a voice recognition program.  Efforts were made to edit the dictation but occasionally words are mis-transcribed)        FINAL IMPRESSION:  1 --peripheral polyneuropathy  2 --chronic bilateral lower extremity pain  3 --prescription refill  4 --history of breast abscess                 Tray Kuo MD  10/30/22 4052

## 2022-12-01 ENCOUNTER — HOSPITAL ENCOUNTER (EMERGENCY)
Age: 47
Discharge: HOME | End: 2022-12-01
Attending: EMERGENCY MEDICINE
Payer: MEDICARE

## 2022-12-01 VITALS
DIASTOLIC BLOOD PRESSURE: 71 MMHG | OXYGEN SATURATION: 100 % | SYSTOLIC BLOOD PRESSURE: 112 MMHG | RESPIRATION RATE: 12 BRPM | HEART RATE: 71 BPM | HEIGHT: 67 IN | BODY MASS INDEX: 16.43 KG/M2 | TEMPERATURE: 97.9 F | WEIGHT: 104.7 LBS

## 2022-12-01 DIAGNOSIS — F11.93 OPIOID WITHDRAWAL (HCC): Primary | ICD-10-CM

## 2022-12-01 LAB
BACTERIA: ABNORMAL /HPF
BILIRUBIN URINE: NEGATIVE
BLOOD, URINE: ABNORMAL
CLARITY: ABNORMAL
COLOR: YELLOW
EPITHELIAL CELLS, UA: ABNORMAL /HPF (ref 0–5)
GLUCOSE URINE: NEGATIVE MG/DL
HCG(URINE) PREGNANCY TEST: NEGATIVE
KETONES, URINE: NEGATIVE MG/DL
LEUKOCYTE ESTERASE, URINE: NEGATIVE
MICROSCOPIC EXAMINATION: YES
NITRITE, URINE: NEGATIVE
PH UA: 5.5 (ref 5–8)
PROTEIN UA: NEGATIVE MG/DL
RBC UA: ABNORMAL /HPF (ref 0–4)
SPECIFIC GRAVITY UA: 1.01 (ref 1–1.03)
URINE TYPE: ABNORMAL
UROBILINOGEN, URINE: 0.2 E.U./DL
WBC UA: ABNORMAL /HPF (ref 0–5)

## 2022-12-01 PROCEDURE — 99283 EMERGENCY DEPT VISIT LOW MDM: CPT

## 2022-12-01 PROCEDURE — 84703 CHORIONIC GONADOTROPIN ASSAY: CPT

## 2022-12-01 PROCEDURE — 81001 URINALYSIS AUTO W/SCOPE: CPT

## 2022-12-01 ASSESSMENT — PAIN - FUNCTIONAL ASSESSMENT: PAIN_FUNCTIONAL_ASSESSMENT: NONE - DENIES PAIN

## 2022-12-01 NOTE — ED TRIAGE NOTES
Patient to ed with withdrawal from suboxone reports she left her suboxone in [place], she started with withdrawal symptoms last night and has a appt tomorrow. Rosio Singh with [facility] called she can be seen today at 10 am her main office Dr Shelli Don informed.

## 2022-12-01 NOTE — ED NOTES
Patient given discharge instructions verbal and written, patient verbalized understanding. Alert/oriented X4, Clear speech. Patient exhibits no distress, ambulates with steady gait per self leaving unit, no further request. Madeline called to take patient to her [facility] appt.      Lanette Bang RN  12/01/22 4595

## 2022-12-01 NOTE — ED PROVIDER NOTES
Emergency Physician Note        Note Open Time: 9:14 AM EST    Chief Complaint  Withdrawal (Suboxone has appt tomorrow)       History of Present Illness  Lissette Morales is a 50 y.o. female who presents to the ED for opioid withdrawal.  Patient reports that she traveled to visit family out of town and left her Suboxone there. She is not due for refill prescription till tomorrow but is experiencing withdrawal symptoms of nausea and vomiting and diarrhea and body aches. She denies any opioid use. 10 systems reviewed, pertinent positives per HPI otherwise noted to be negative    I have reviewed the following from the nursing documentation:      Prior to Admission medications    Medication Sig Start Date End Date Taking? Authorizing Provider   gabapentin (NEURONTIN) 300 MG capsule Take 1 capsule by mouth 3 times daily for 30 days. 10/28/22 11/27/22  Donis Rainey MD   Buprenorphine HCl-Naloxone HCl (SUBOXONE SL) Place 16 mg under the tongue    Historical Provider, MD   ondansetron (ZOFRAN) 8 MG tablet Take 1 tablet by mouth every 8 hours as needed for Nausea 3/16/22   Suzy Hodges MD   ibuprofen (IBU) 600 MG tablet Take 1 tablet by mouth every 8 hours as needed for Pain or Fever (with food) 3/27/19   Donis Rainey MD   lidocaine (LIDODERM) 5 % Place 1 patch onto the skin daily 12 hours on, 12 hours off. 1/3/19   Nadeen Pradhan MD   hydrocortisone 1 % ointment Apply topically 2 times daily. 18   Eli Jimenez MD       Allergies as of 2022 - Fully Reviewed 2022   Allergen Reaction Noted    Pcn [penicillins]  2014       Past Medical History:   Diagnosis Date    Drug abuse ([facility] [address]     Fibromyalgia     Migraine         Surgical History:   Past Surgical History:   Procedure Laterality Date     SECTION       SECTION, CLASSIC          Family History:  History reviewed. No pertinent family history.     Social History     Socioeconomic History    Marital status: Single Spouse name: Not on file    Number of children: Not on file    Years of education: Not on file    Highest education level: Not on file   Occupational History    Not on file   Tobacco Use    Smoking status: Every Day     Packs/day: 1.50     Types: Cigarettes    Smokeless tobacco: Never   Substance and Sexual Activity    Alcohol use: Not Currently     Comment: occ    Drug use: Not Currently     Types: Cocaine     Comment: Smokes cocaine. 1/3 states she has been clean since labor day    Sexual activity: Not Currently   Other Topics Concern    Not on file   Social History Narrative    ** Merged History Encounter **          Social Determinants of Health     Financial Resource Strain: Not on file   Food Insecurity: Not on file   Transportation Needs: Not on file   Physical Activity: Not on file   Stress: Not on file   Social Connections: Not on file   Intimate Partner Violence: Not on file   Housing Stability: Not on file       Nursing notes reviewed. ED Triage Vitals [12/01/22 0837]   Enc Vitals Group      /71      Heart Rate 71      Resp 12      Temp 97.9 °F (36.6 °C)      Temp Source Oral      SpO2 100 %      Weight 104 lb 11.2 oz (47.5 kg)      Height 5' 7\" (1.702 m)      Head Circumference       Peak Flow       Pain Score       Pain Loc       Pain Edu? Excl. in [address]? GENERAL:  Awake, alert. Well developed, well nourished with no apparent distress. HENT:  Normocephalic, Atraumatic, moist mucous membranes. EYES:  Pupils equal round and reactive to light, Conjunctiva normal, extraocular movements normal.  NECK:  No meningeal signs, Supple. CHEST:  Regular rate and rhythm, chest wall non-tender. LUNGS:  Clear to auscultation bilaterally. ABDOMEN:  Soft, non-tender, no rebound, rigidity or guarding, non-distended, normal bowel sounds. No costovertebral angle tenderness to palpation. BACK:  No tenderness.    EXTREMITIES:  Normal range of motion, no edema, no bony tenderness, no deformity, distal pulses present. SKIN: Warm, dry and intact. NEUROLOGIC: Normal mental status. Moving all extremities to command. LABS  Labs Reviewed   URINALYSIS - Abnormal; Notable for the following components:       Result Value    Clarity, UA SL CLOUDY (*)     Blood, Urine SMALL (*)     All other components within normal limits   MICROSCOPIC URINALYSIS - Abnormal; Notable for the following components:    Epithelial Cells, UA 11-20 (*)     Bacteria, UA 1+ (*)     All other components within normal limits   PREGNANCY, URINE     MEDICAL DECISION MAKING    Patient receives Suboxone through [facility] and I spoke with their clinic coordinator who informed the patient can be seen at 10 AM today and if she is in withdrawal on arrival they will treat her. Advised patient if they are not able unwilling to treat her today she can return for dose of Suboxone here. I advised the patient to return to the emergency department immediately for any new or worsening symptoms, such as chest pain, shortness of breath or bleeding. The patient voiced agreement and understanding of the treatment plan.       Results for orders placed or performed during the hospital encounter of 12/01/22   Urinalysis   Result Value Ref Range    Color, UA Yellow Straw/Yellow    Clarity, UA SL CLOUDY (A) Clear    Glucose, Ur Negative Negative mg/dL    Bilirubin Urine Negative Negative    Ketones, Urine Negative Negative mg/dL    Specific Gravity, UA 1.010 1.005 - 1.030    Blood, Urine SMALL (A) Negative    pH, UA 5.5 5.0 - 8.0    Protein, UA Negative Negative mg/dL    Urobilinogen, Urine 0.2 <2.0 E.U./dL    Nitrite, Urine Negative Negative    Leukocyte Esterase, Urine Negative Negative    Microscopic Examination YES     Urine Type NotGiven    Pregnancy, Urine   Result Value Ref Range    HCG(Urine) Pregnancy Test Negative Detects HCG level >20 MIU/mL   Microscopic Urinalysis   Result Value Ref Range    WBC, UA 3-5 0 - 5 /HPF    RBC, UA 3-4 0 - 4 /HPF    Epithelial Cells, UA 11-20 (A) 0 - 5 /HPF    Bacteria, UA 1+ (A) None Seen /HPF       I estimate there is LOW risk for ACUTE CORONARY SYNDROME, INTRACRANIAL HEMORRHAGE, MALIGNANT DYSRHYTHMIA, MENINGITIS, PNEUMONIA, PULMONARY EMBOLISM, SEPSIS, SUBARACHNOID HEMORRHAGE, SUBDURAL HEMATOMA, STROKE, or URINARY TRACT INFECTION, thus I consider the discharge disposition reasonable. Prashanth Poon and I have discussed the diagnosis and risks, and we agree with discharging home to follow-up with their primary doctor. We also discussed returning to the Emergency Department immediately if new or worsening symptoms occur. We have discussed the symptoms which are most concerning (e.g., changing or worsening pain, weakness, vomiting, fever) that necessitate immediate return. Final Impression    1. Opioid withdrawal (HCC)        Blood pressure 112/71, pulse 71, temperature 97.9 °F (36.6 °C), temperature source Oral, resp. rate 12, height 5' 7\" (1.702 m), weight 104 lb 11.2 oz (47.5 kg), SpO2 100 %. Patient was given scripts for the following medications. I counseled patient how to take these medications. New Prescriptions    No medications on file       Disposition  Pt is in good condition upon Discharge to home. This chart was generated using the [address] [place] [address] [address]  dictation system. I created this record but it may contain dictation errors.           Jacquelyn Burnett MD  12/01/22 6263

## 2023-04-18 ENCOUNTER — HOSPITAL ENCOUNTER (EMERGENCY)
Age: 48
Discharge: HOME | End: 2023-04-18
Attending: EMERGENCY MEDICINE
Payer: MEDICAID

## 2023-04-18 VITALS
RESPIRATION RATE: 12 BRPM | SYSTOLIC BLOOD PRESSURE: 118 MMHG | DIASTOLIC BLOOD PRESSURE: 83 MMHG | TEMPERATURE: 98.3 F | HEART RATE: 93 BPM | HEIGHT: 67 IN | BODY MASS INDEX: 15.9 KG/M2 | WEIGHT: 101.3 LBS | OXYGEN SATURATION: 100 %

## 2023-04-18 DIAGNOSIS — U07.1 COVID-19: Primary | ICD-10-CM

## 2023-04-18 DIAGNOSIS — Z76.0 ENCOUNTER FOR MEDICATION REFILL: ICD-10-CM

## 2023-04-18 LAB — SARS-COV-2 RDRP RESP QL NAA+PROBE: DETECTED

## 2023-04-18 PROCEDURE — 87635 SARS-COV-2 COVID-19 AMP PRB: CPT

## 2023-04-18 PROCEDURE — 6360000002 HC RX W HCPCS: Performed by: EMERGENCY MEDICINE

## 2023-04-18 PROCEDURE — 99283 EMERGENCY DEPT VISIT LOW MDM: CPT

## 2023-04-18 RX ORDER — GUAIFENESIN 600 MG/1
600 TABLET, EXTENDED RELEASE ORAL 2 TIMES DAILY
Qty: 30 TABLET | Refills: 0 | Status: SHIPPED | OUTPATIENT
Start: 2023-04-18 | End: 2023-05-03

## 2023-04-18 RX ORDER — ONDANSETRON 4 MG/1
4 TABLET, FILM COATED ORAL 3 TIMES DAILY PRN
Qty: 15 TABLET | Refills: 0 | Status: SHIPPED | OUTPATIENT
Start: 2023-04-18

## 2023-04-18 RX ORDER — BUPRENORPHINE HYDROCHLORIDE 8 MG/1
16 TABLET SUBLINGUAL DAILY
Status: DISCONTINUED | OUTPATIENT
Start: 2023-04-18 | End: 2023-04-18 | Stop reason: HOSPADM

## 2023-04-18 RX ORDER — ACETAMINOPHEN 500 MG
500 TABLET ORAL 4 TIMES DAILY PRN
Qty: 120 TABLET | Refills: 0 | Status: SHIPPED | OUTPATIENT
Start: 2023-04-18

## 2023-04-18 RX ORDER — BUPRENORPHINE HYDROCHLORIDE 8 MG/1
8 TABLET SUBLINGUAL DAILY
Status: DISCONTINUED | OUTPATIENT
Start: 2023-04-18 | End: 2023-04-18

## 2023-04-18 RX ADMIN — BUPRENORPHINE HCL 8 MG: 8 TABLET SUBLINGUAL at 17:03

## 2023-04-18 ASSESSMENT — PAIN - FUNCTIONAL ASSESSMENT: PAIN_FUNCTIONAL_ASSESSMENT: 0-10

## 2023-04-18 ASSESSMENT — PAIN SCALES - GENERAL: PAINLEVEL_OUTOF10: 8

## 2023-04-18 NOTE — ED PROVIDER NOTES
Patient was given the following medications:   Medications - No data to display          CC/HPI Summary, DDx, ED Course, and Reassessment:     Patient presents to the emergency department with concern for COVID-19 as well as requesting Suboxone treatment. Patient states that multiple family members have been diagnosed with similar at home. She has been under the weather and unable to go to her Suboxone appointments for the last week. Patient is COVID-positive. Vitals are stable. O2 sats are stable. I contacted patient's addiction provider at [facility] who agrees with Subutex treatment and arrange for follow-up tomorrow. CONSULTS: None   Social Determinants: None   Chronic Conditions: Suboxone use. Disposition Considerations: None. I am the primary physician of Record. FINAL IMPRESSION    1. COVID-19    2. Encounter for medication refill         DISPOSITION/PLAN   DISPOSITION         PATIENT REFERRED TO:   No follow-up provider specified.    DISCHARGE MEDICATIONS:   Discharge Medication List as of 4/18/2023  4:40 PM        START taking these medications    Details   acetaminophen (TYLENOL) 500 MG tablet Take 1 tablet by mouth 4 times daily as needed for Pain, Disp-120 tablet, R-0Normal      guaiFENesin (MUCINEX) 600 MG extended release tablet Take 1 tablet by mouth 2 times daily for 15 days, Disp-30 tablet, R-0Normal            DISCONTINUED MEDICATIONS:   Discharge Medication List as of 4/18/2023  4:40 PM               (Please note that portions of this note were completed with a voice recognition program.  Efforts were made to edit the dictations but occasionally words are mis-transcribed.)     Luke Cooks, DO (electronically signed)         Luke Cooks, DO  04/18/23 8569

## 2023-05-31 ENCOUNTER — HOSPITAL ENCOUNTER (EMERGENCY)
Age: 48
Discharge: HOME | End: 2023-05-31
Attending: EMERGENCY MEDICINE
Payer: MEDICAID

## 2023-05-31 ENCOUNTER — HOSPITAL ENCOUNTER (EMERGENCY)
Age: 48
Discharge: HOME | End: 2023-05-31

## 2023-05-31 VITALS
TEMPERATURE: 98 F | HEIGHT: 67 IN | BODY MASS INDEX: 15.39 KG/M2 | HEART RATE: 79 BPM | WEIGHT: 98.06 LBS | OXYGEN SATURATION: 98 % | RESPIRATION RATE: 16 BRPM | DIASTOLIC BLOOD PRESSURE: 70 MMHG | SYSTOLIC BLOOD PRESSURE: 111 MMHG

## 2023-05-31 VITALS
HEIGHT: 67 IN | HEART RATE: 83 BPM | OXYGEN SATURATION: 96 % | DIASTOLIC BLOOD PRESSURE: 71 MMHG | BODY MASS INDEX: 15.37 KG/M2 | WEIGHT: 97.9 LBS | TEMPERATURE: 98.7 F | RESPIRATION RATE: 12 BRPM | SYSTOLIC BLOOD PRESSURE: 110 MMHG

## 2023-05-31 DIAGNOSIS — L03.113 CELLULITIS OF RIGHT UPPER EXTREMITY: Primary | ICD-10-CM

## 2023-05-31 PROCEDURE — 6370000000 HC RX 637 (ALT 250 FOR IP): Performed by: EMERGENCY MEDICINE

## 2023-05-31 PROCEDURE — 99283 EMERGENCY DEPT VISIT LOW MDM: CPT

## 2023-05-31 RX ORDER — CLINDAMYCIN HYDROCHLORIDE 300 MG/1
300 CAPSULE ORAL 4 TIMES DAILY
Qty: 28 CAPSULE | Refills: 0 | Status: SHIPPED | OUTPATIENT
Start: 2023-05-31 | End: 2023-06-07

## 2023-05-31 RX ORDER — CLINDAMYCIN HYDROCHLORIDE 300 MG/1
300 CAPSULE ORAL EVERY 6 HOURS SCHEDULED
Status: DISCONTINUED | OUTPATIENT
Start: 2023-06-01 | End: 2023-06-01 | Stop reason: HOSPADM

## 2023-05-31 RX ADMIN — CLINDAMYCIN HYDROCHLORIDE 300 MG: 300 CAPSULE ORAL at 22:32

## 2023-05-31 ASSESSMENT — PAIN SCALES - GENERAL: PAINLEVEL_OUTOF10: 4

## 2023-05-31 ASSESSMENT — PAIN - FUNCTIONAL ASSESSMENT: PAIN_FUNCTIONAL_ASSESSMENT: 0-10

## 2023-05-31 NOTE — ED TRIAGE NOTES
53y/o female presents to the ED with right hand pain and swelling. Pt states she had a paper cut 1wk prior and it head. However, since she developed this redness, swelling. Pt states she also recently moving heavy items around as well that she states may have attributed to her hand concerns. Pt states pain radiates to elbow. -n/v/f/c/d/.

## 2023-06-01 NOTE — ED NOTES
Patient verbalized understanding of d/c instructions. Patient given scripts x 1. Patient left the ED and instructed on follow up.         Marleny Carvalho RN  05/31/23 1022

## 2023-06-02 NOTE — ED PROVIDER NOTES
suppurative thrombophlebitis, aseptic superficial thrombophlebitis, DVT, gout, compartment syndrome, erythema migrans (lyme disease), contact dermatitis, lymphedema, other    75-year-old female presents to the ED for evaluation of atraumatic swelling and redness to the right hand. On exam patient has no pain with passive flexion extension of the wrist with fingers. Right hand does appear slightly swollen and erythematous compared to left. Discussed with patient that I suspect she has cellulitis. We will put on oral antibiotics. She is instructed to use warm compresses and soaks. Patient amenable to discharge home with outpatient follow-up. Low suspicion for septic arthritis. CONSULTS: (Who and What was discussed)  None          Chronic Conditions:   Past Medical History:   Diagnosis Date    Drug abuse ([facility] [address]     Fibromyalgia     Migraine          Records Reviewed (External and source): The patient's previous ED visits    Disposition Considerations (include 1 Tests not done, Admit vs D/C, Shared Decision Making, Pt Expectation of Test or Tx.): Consider obtaining x-ray but patient denies any injury or trauma. Admission to the hospital considered but patient's symptoms are improving. Vital signs and testing performed is reassuring. Based on this patient is appropriate for outpatient management. No indication for admission at this time. Symptomatic treatment with expectant management discussed with the patient and/or family member or surrogates present and they are amenable to treatment plan and outpatient follow-up. Strict return precautions were discussed with the patient and those present. All parties involved were informed that condition may persist or worsen in which case they may then require inpatient treatment, currently there is no indication. They demonstrated understanding of when to return to the emergency department for new or worsening symptoms.       I am the Primary Clinician of

## 2024-05-15 ENCOUNTER — HOSPITAL ENCOUNTER (EMERGENCY)
Age: 49
Discharge: HOME | End: 2024-05-15
Attending: EMERGENCY MEDICINE
Payer: MEDICAID

## 2024-05-15 VITALS
DIASTOLIC BLOOD PRESSURE: 81 MMHG | SYSTOLIC BLOOD PRESSURE: 114 MMHG | BODY MASS INDEX: 15.07 KG/M2 | HEIGHT: 67 IN | WEIGHT: 96 LBS | TEMPERATURE: 97.7 F | HEART RATE: 76 BPM

## 2024-05-15 DIAGNOSIS — L50.9 URTICARIA: ICD-10-CM

## 2024-05-15 DIAGNOSIS — T78.40XA ALLERGIC REACTION, INITIAL ENCOUNTER: Primary | ICD-10-CM

## 2024-05-15 PROCEDURE — 99283 EMERGENCY DEPT VISIT LOW MDM: CPT

## 2024-05-15 PROCEDURE — 6370000000 HC RX 637 (ALT 250 FOR IP): Performed by: EMERGENCY MEDICINE

## 2024-05-15 RX ORDER — DIPHENHYDRAMINE HCL 25 MG
50 TABLET ORAL ONCE
Status: COMPLETED | OUTPATIENT
Start: 2024-05-15 | End: 2024-05-15

## 2024-05-15 RX ORDER — PREDNISONE 10 MG/1
TABLET ORAL
Qty: 30 TABLET | Refills: 0 | Status: SHIPPED | OUTPATIENT
Start: 2024-05-15

## 2024-05-15 RX ORDER — PREDNISONE 20 MG/1
60 TABLET ORAL ONCE
Status: COMPLETED | OUTPATIENT
Start: 2024-05-15 | End: 2024-05-15

## 2024-05-15 RX ORDER — DIPHENHYDRAMINE HCL 25 MG
25-50 CAPSULE ORAL EVERY 4 HOURS PRN
Qty: 40 CAPSULE | Refills: 0 | Status: SHIPPED | OUTPATIENT
Start: 2024-05-15 | End: 2024-05-25

## 2024-05-15 RX ADMIN — PREDNISONE 60 MG: 20 TABLET ORAL at 14:34

## 2024-05-15 RX ADMIN — DIPHENHYDRAMINE HYDROCHLORIDE 50 MG: 25 TABLET ORAL at 14:34

## 2024-05-15 ASSESSMENT — PAIN - FUNCTIONAL ASSESSMENT
PAIN_FUNCTIONAL_ASSESSMENT: NONE - DENIES PAIN
PAIN_FUNCTIONAL_ASSESSMENT: NONE - DENIES PAIN

## 2024-05-15 NOTE — ED PROVIDER NOTES
EMERGENCY DEPARTMENT ENCOUNTER     [facility] EMERGENCY DEPARTMENT     Pt Name: Gissel Esparza   MRN: [id number]   Birthdate 1975   Date of evaluation: 5/15/2024   Provider: Zain Mcmahon MD   PCP: John Alvarez MD   Note Started: 4:57 PM EDT 5/15/24     CHIEF COMPLAINT     Chief Complaint   Patient presents with    Rash     generalized        HISTORY OF PRESENT ILLNESS:  History from : Patient   Limitations to history : None     Gissel Esparza is a 49 y.o. female who presents for rash.  Patient reports that she recently started using Zest soap again which she has not used in a long time and broke out with a rash the last couple of days.  It is pruritic.  She denies any other complaints.  No trouble breathing.  No GI symptoms.    Nursing Notes were all reviewed and agreed with or any disagreements were addressed in the HPI.     ROS: Positives and Pertinent negatives as per HPI.    PAST MEDICAL HISTORY     Past medical history:  has a past medical history of Drug abuse (HCC), Fibromyalgia, and Migraine.    Past surgical history:  has a past surgical history that includes  section and  section, classic.      PHYSICAL EXAM:  ED Triage Vitals [05/15/24 1359]   BP Temp Temp src Pulse Resp SpO2 Height Weight - Scale   114/81 97.7 °F (36.5 °C) -- 76 -- -- 1.702 m (5' 7\") 43.5 kg (96 lb)        Physical Exam   No tongue, lip or uvular edema.  Erythematous/urticarial rash spares the palms and soles and mucous membranes.  No stridor or wheezing.        EMERGENCY DEPARTMENT COURSE and DIFFERENTIAL DIAGNOSIS/MDM:     Vitals:    Vitals:    05/15/24 1359   BP: 114/81   Pulse: 76   Temp: 97.7 °F (36.5 °C)   Weight: 43.5 kg (96 lb)   Height: 1.702 m (5' 7\")        Patient was given the following medications:   Medications   predniSONE (DELTASONE) tablet 60 mg (60 mg Oral Given 5/15/24 1434)   diphenhydrAMINE (BENADRYL) tablet 50 mg (50 mg Oral Given 5/15/24 1434)             CC/HPI Summary, DDx,

## 2024-05-15 NOTE — DISCHARGE INSTR - COC
Continuity of Care Form    Patient Name: Gissel Esparza   :  1975  MRN:  [id number]    Admit date:  5/15/2024  Discharge date:  ***    Code Status Order: Prior   Advance Directives:     Admitting Physician:  No admitting provider for patient encounter.  PCP: John Alvarez MD    Discharging Nurse: ***  Discharging Hospital Unit/Room#:   Discharging Unit Phone Number: ***    Emergency Contact:   Extended Emergency Contact Information  Primary Emergency Contact: Carson Esparza  Home Phone: [phone]  Mobile Phone: [phone]  Relation: Brother/Sister  Secondary Emergency Contact: Arlin Guzman  Home Phone: [phone]  Relation: Brother/Sister    Past Surgical History:  Past Surgical History:   Procedure Laterality Date     SECTION       SECTION, CLASSIC         Immunization History:   There is no immunization history for the selected administration types on file for this patient.    Active Problems:  Patient Active Problem List   Diagnosis Code    Cocaine abuse ([facility]) F14.10    Altered mental status R41.82    Shock ([facility]) R57.9    Non-traumatic rhabdomyolysis M62.82    Pleural effusion J90       Isolation/Infection:   Isolation            No Isolation           Unreconciled Outside Infections       External data in this report might not trigger clinical decision support.    .      Infection Onset Last Indicated Last Received Source    MRSA 10/26/12 10/26/12 06/20/21 [facility]          Patient Infection Status       Infection Onset Added Last Indicated Last Indicated By Review Planned Expiration Resolved Resolved By    None active    Resolved    COVID-19 23 COVID-19, Rapid   23 Infection                        Nurse Assessment:  Last Vital Signs: /81   Pulse 76   Temp 97.7 °F (36.5 °C)   Ht 1.702 m (5' 7\")   Wt 43.5 kg (96 lb)   BMI 15.04 kg/m²     Last documented pain score (0-10 scale):    Last Weight:   Wt Readings from Last 1

## 2024-05-15 NOTE — ED NOTES
Patient to ed with complaints of a generalized itchy rash which started 2 days ago after using soap products.